# Patient Record
Sex: FEMALE | Race: WHITE | URBAN - METROPOLITAN AREA
[De-identification: names, ages, dates, MRNs, and addresses within clinical notes are randomized per-mention and may not be internally consistent; named-entity substitution may affect disease eponyms.]

---

## 2019-05-23 ENCOUNTER — CONVERSION ENCOUNTER (OUTPATIENT)
Dept: GASTROENTEROLOGY | Facility: CLINIC | Age: 54
End: 2019-05-23
Attending: INTERNAL MEDICINE

## 2019-08-09 ENCOUNTER — HOSPITAL ENCOUNTER (OUTPATIENT)
Dept: GASTROENTEROLOGY | Facility: HOSPITAL | Age: 54
Setting detail: HOSPITAL OUTPATIENT SURGERY
Discharge: HOME | End: 2019-08-09
Attending: INTERNAL MEDICINE

## 2019-12-10 ENCOUNTER — OFFICE VISIT CONVERTED (OUTPATIENT)
Dept: ORTHOPEDIC SURGERY | Facility: CLINIC | Age: 54
End: 2019-12-10
Attending: ORTHOPAEDIC SURGERY

## 2020-02-25 ENCOUNTER — OFFICE VISIT CONVERTED (OUTPATIENT)
Dept: NEUROSURGERY | Facility: CLINIC | Age: 55
End: 2020-02-25
Attending: PHYSICIAN ASSISTANT

## 2020-02-25 ENCOUNTER — CONVERSION ENCOUNTER (OUTPATIENT)
Dept: NEUROLOGY | Facility: CLINIC | Age: 55
End: 2020-02-25

## 2020-06-23 ENCOUNTER — OFFICE VISIT CONVERTED (OUTPATIENT)
Dept: NEUROLOGY | Facility: CLINIC | Age: 55
End: 2020-06-23
Attending: PSYCHIATRY & NEUROLOGY

## 2020-10-14 ENCOUNTER — OFFICE VISIT CONVERTED (OUTPATIENT)
Dept: NEUROLOGY | Facility: CLINIC | Age: 55
End: 2020-10-14
Attending: PSYCHIATRY & NEUROLOGY

## 2020-10-14 ENCOUNTER — CONVERSION ENCOUNTER (OUTPATIENT)
Dept: NEUROLOGY | Facility: CLINIC | Age: 55
End: 2020-10-14

## 2020-12-14 ENCOUNTER — CONVERSION ENCOUNTER (OUTPATIENT)
Dept: NEUROLOGY | Facility: CLINIC | Age: 55
End: 2020-12-14

## 2020-12-14 ENCOUNTER — OFFICE VISIT CONVERTED (OUTPATIENT)
Dept: NEUROLOGY | Facility: CLINIC | Age: 55
End: 2020-12-14
Attending: PSYCHIATRY & NEUROLOGY

## 2021-03-15 ENCOUNTER — OFFICE VISIT CONVERTED (OUTPATIENT)
Dept: NEUROLOGY | Facility: CLINIC | Age: 56
End: 2021-03-15
Attending: PSYCHIATRY & NEUROLOGY

## 2021-05-10 NOTE — H&P
History and Physical      Patient Name: Aaliyah Carson   Patient ID: [id number]   Sex: Female   YOB: 1965    Referring Provider: Kendall Shcrader MD    Visit Date: June 23, 2020    Provider: Junito Schmitz MD   Location: [facility]   Location Address: [address] [place] [address]  [id number]   Location Phone: [phone]          Chief Complaint     RUE numbness and tingling pain       History Of Present Illness  Aaliyah Carson is a 54 year old female who presents today to [facility] today referred from Kendall Schrader MD.      54-year-old woman evaluated for right arm pain.  She states that she started having right arm pain in 2009.  This is before her deployment of can a stent.  She had rotator cuff surgery in 2007.  She states her right arm continues to hurt and her significant limited range of motion.  She states that the pain is worse at night when she is sleeping and it wakes her up.  Her right arm hurts from the shoulder down when this happens.  She is had an MRI of the cervical spine which there is no significant neuroforaminal narrowing.       Past Medical History  *No Pertinent Past Medical History; Migraines; Tuberculosis         Past Surgical History  Hysterectomy; Partial Hysterectomy; Shoulder surgery         Allergy List  NO KNOWN DRUG ALLERGIES         Family Medical History  No family history of colorectal cancer; Family history of Arthritis         Social History  Alcohol (Current some day); ; Tobacco (Never); Working         Review of Systems  · Constitutional  o Denies  o : chills, excessive sweating, fatigue, fever, sycope/passing out, weight gain, weight loss  · Eyes  o Denies  o : changes in vision, blurry vision, double vision  · HENT  o Denies  o : loss of hearing, ringing in the ears, ear aches, sore throat, nasal congestion, sinus pain, nose bleeds, seasonal allergies  · Cardiovascular  o Denies  o : blood  "clots, swollen legs, anemia, easy burising or bleeding, transfusions  · Respiratory  o Denies  o : shortness of breath, dry cough, productive cough, pneumonia, COPD  · Gastrointestinal  o Denies  o : difficulty swallowing, reflux  · Genitourinary  o Denies  o : incontinence  · Neurologic  o Admits  o : difficulty with sleep, numbness/tingling/paresthesia   o Denies  o : headache, seizure, stroke, tremor, loss of balance, falls, dizziness/vertigo, difficulty with coordination, difficulty with dexterity, weakness  · Musculoskeletal  o Admits  o : neck stiffness/pain, weakness, pain radiating in arm  o Denies  o : swollen lymph nodes, muscle aches, joint pain, spasms, sciatica, pain radiating in leg, low back pain  · Endocrine  o Denies  o : diabetes, thyroid disorder  · Psychiatric  o Admits  o : anxiety  o Denies  o : depression      Vitals  Date Time BP Position Site L\R Cuff Size HR RR TEMP (F) WT  HT  BMI kg/m2 BSA m2 O2 Sat HC       06/23/2020 02:42 /80 Sitting    75 - R 18 97 143lbs 0oz 5'  2.5\" 25.74 1.69           Physical Examination     There is no weakness of the right upper extremities in these muscle testing.           Assessment  · Numbness and tingling       Anesthesia of skin     782.0/R20.0  Paresthesia of skin     782.0/R20.2  The EMG study is normal and does not show electrophysiologic evidence for cervical radiculopathy involving the C5-T1 ventral nerve roots. There is no evidence of denervation or reinnervation. Nerve conduction study shows electrophysiologic evidence for mild carpal tunnel syndrome.  · Shoulder pain     719.41/M25.519  I told her that her shoulder pain and neck pain is secondary to shoulder pathology and not cervical spine pathology.    Problems Reconciled  Plan  · Orders  o Muscle test done with Nerve test Complete (24289) - 719.41/M25.519, 782.0/R20.0, 782.0/R20.2 - 06/23/2020  o Nerve conduction studies; 5-6 studies (74195) - 719.41/M25.519, 782.0/R20.0, 782.0/R20.2 - " 06/23/2020  · Medications  o Medications have been Reconciled  o Transition of Care or Provider Policy  · Instructions  o Encouraged to follow-up with Primary Care Provider for preventative care.            Electronically Signed by: Junito Schmitz MD -Author on June 23, 2020 03:33:40 PM

## 2021-05-13 NOTE — PROGRESS NOTES
Progress Note      Patient Name: Aaliyah Carson   Patient ID: [id number]   Sex: Female   YOB: 1965    Referring Provider: Kendall Schrader MD    Visit Date: October 14, 2020    Provider: Junito Schmitz MD   Location: [facility]   Location Address: [address] [place] [address]  [id number]   Location Phone: [phone]          Chief Complaint     Established pt for migraines.       History Of Present Illness  Aaliyah Carson is a 55 year old female who presents today to [facility] today referred from Kendall Schrader MD.      55-year-old woman evaluated for chronic migraines.  She states that she was seen in the emergency room after she fell and hit her head on August 28.  She got dizzy and her right leg gave out on her and fell and hit her head.  She was not coherent.  She went to the emergency room and was told to follow-up with the neurologist because of her poorly controlled migraine headaches.  She states that she had numbness in the left side of her face and shoulder for 3-1/2 hours to have a day.  She did not have a headache that day.  She only had a headache on the left side of her head where she hit her head.    She still having migraines and she was deployed to [place] in 2010.  She states that her headaches have gotten worse and she is been taking Fioricet 4 times a day almost daily since a year ago.  Her nortriptyline was increased to 50 mg recently and her headaches are slightly better.  She takes Fioricet 4 times a day.  The headaches usually starts in the left frontal area and goes to the back of her head.  It is sharp and throbbing associated with light and noise sensitivity and nausea.  It can last from 4 hours to 1 day.  It is severe 5 times a month.  She has never taken topiramate in the past.  She has taken sumatriptan in the past which did not help her.    She states that she is retiring from the   after 20 years of service       Past Medical History  *No Pertinent Past Medical History; Migraines; Tuberculosis         Past Surgical History  Hysterectomy; Partial Hysterectomy; Shoulder surgery         Medication List  butalbital-acetaminophen  mg oral tablet; clonidine 0.1 mg/24 hr transdermal patch weekly; epinephrine 0.3 mg/0.3 mL injection auto-injector; nortriptyline 50 mg oral capsule; Zyrtec 10 mg oral capsule         Allergy List  NO KNOWN DRUG ALLERGIES         Family Medical History  No family history of colorectal cancer; Family history of Arthritis         Social History  Alcohol (Current some day); ; Tobacco (Never); Working         Review of Systems  · Constitutional  o Denies  o : chills, excessive sweating, fatigue, fever, sycope/passing out, weight gain, weight loss  · Eyes  o Denies  o : changes in vision, blurry vision, double vision  · HENT  o Admits  o : ringing in the ears, seasonal allergies  o Denies  o : loss of hearing, ear aches, sore throat, nasal congestion, sinus pain, nose bleeds  · Cardiovascular  o Admits  o : swollen legs  o Denies  o : blood clots, anemia, easy burising or bleeding, transfusions  · Respiratory  o Admits  o : shortness of breath  o Denies  o : dry cough, productive cough, pneumonia, COPD  · Gastrointestinal  o Denies  o : difficulty swallowing, reflux  · Genitourinary  o Denies  o : incontinence  · Neurologic  o Admits  o : headache, loss of balance, dizziness/vertigo, difficulty with sleep, numbness/tingling/paresthesia , difficulty with coordination, difficulty with dexterity, weakness  o Denies  o : seizure, stroke, tremor, falls  · Musculoskeletal  o Admits  o : neck stiffness/pain, muscle aches, joint pain, weakness, spasms, sciatica, pain radiating in arm, pain radiating in leg, low back pain  o Denies  o : swollen lymph nodes  · Endocrine  o Denies  o : diabetes, thyroid disorder  · Psychiatric  o Denies  o : anxiety,  "depression      Vitals  Date Time BP Position Site L\R Cuff Size HR RR TEMP (F) WT  HT  BMI kg/m2 BSA m2 O2 Sat FR L/min FiO2 HC       10/14/2020 02:38 PM        97.1           10/14/2020 03:05 /83 Sitting    83 - R   141lbs 0oz 5'  2\" 25.79 1.67             Physical Examination  · Constitutional  o Appearance  o : well-nourished, well groomed, in no apparent distress  · Eyes  o Pupils and Irises  o : pupils equal, round, and reactive to light and accommodation bilaterally  · Respiratory  o Auscultation of Lungs  o : Lungs were clear to ascultation bilaterally.   · Cardiovascular  o Heart  o :   § Auscultation of Heart  § : regular rhythem and normal rate.  o Peripheral Vascular System  o :   § Carotid Arteries  § : carotids are clear bilaterally  § Extremities  § : no peripheral edema was appreciated  · Musculoskeletal  o General  o : normal bulk and normal tone throughout. 5/5 motor strength throughout and symmetric.   · Neurologic  o Mental Status Examination  o :   § Orientation  § : Alert and oriented to person, place, and time.  § Speech/Language  § : Intact naming, comprehension, and repetition. No dysarthria.  § Memory  § : Intact  § Attention  § : Intact  § Fund of Knowledge  § : Adequate fund of knowledge.  § Mental Status Examination  § : Mental Status Score:   o Cranial Nerves  o : Pupils are equal, round and reactive to light. Extraocular movements are intact. Visual fields are full. Fundoscopic examination reveals sharp disc bilaterally. Sensation in the V1-V3 distribution is intact and symmetric. Muscles of mastication are strong and symmetric. Muscles of facial expression are strong and symmetric. Hearing is intact. Palatal raise is intact and symmetric. Uvula is midline. Shoulder shrug is strong. Tongue protrudes in the midline.  o Motor Examination  o : Normal bulk. 5/5 motor strength throughout and symmetric.   o Reflexes  o : 2+ reflexes throughout and symmetric.   o Sensation  o : Intact " sensation to light touch, symmetrical  o Gait and Station  o : Normal gait, arm swing and turning. Able to tiptoe, heel walk and alyssa and tandem without difficulty.  o Cerebellar Function  o : Intact finger to nose, rapid alternating movements and fine finger movements.          Assessment  · Medication overuse headache     339.3/G44.40  · Chronic migraine without aura     346.70/G43.709  I will start her on topiramate at 25 mg initially and increase the dose by 25 mg every week in 2 divided doses until she is taken up to 50 mg twice a day. I explained to her the adverse effects of topiramate including mood swings, rarely kidney stones, metabolic acidosis, weight loss. I told her it can cause paresthesias as well. She has medication overuse headache. I would like her to taper herself off Fioricet and then stop taking it. She can take sumatriptan for severe headaches. I will see her again in 2 months time for follow-up. Should her headaches not be improved at that time I will start her on 1 of the injectables. I will also start her on Nurtec for abortive treatment.    40 minutes was spent for this high complexity visit more than half the time was spent face-to-face with the patient for examination, counseling, planning and recommendations.      Plan  · Medications  o topiramate 25 mg oral tablet   SI QD X 1 wk, 1 BID 2nd wk, 1 Q AM and 2 Q PM 3rd wk and 2 BID 4th wk and thereafter.   DISP: (120) Tablet with 6 refills  Prescribed on 10/14/2020     o Medications have been Reconciled  o Transition of Care or Provider Policy  · Instructions  o Encouraged to follow-up with Primary Care Provider for preventative care.  · Referrals  o ID: [id number] Date: 10/09/2020 Type: Inbound  Specialty: Neurology            Electronically Signed by: Junito Schmitz MD -Author on 2020 03:51:03 PM

## 2021-05-13 NOTE — PROGRESS NOTES
Progress Note      Patient Name: Aaliyah Carson   Patient ID: [id number]   Sex: Female   YOB: 1965    Referring Provider: Kendall Schrader MD    Visit Date: December 14, 2020    Provider: Junito Schmitz MD   Location: [facility]   Location Address: [address] [place] [address]  [id number]   Location Phone: [phone]          Chief Complaint     F/u visit for migraines.       History Of Present Illness  Aaliyah Carson is a 55 year old female who presents today to [facility] today referred from Kendall Schrader MD.      55-year-old woman here for follow-up for headaches.  She states that she cannot remember most of her topiramate.  She tells me that she is taking 1 twice a day.  I gave her instructions how to do that the last time and she states that she thinks that she is taking it the right way.  She has had memory loss since she came back from [facility].  It is the same.  It is not get any worse.  She states that she is not under a lot of stress.  She does not have a diagnosis of PTSD.  She tells me that her headaches are getting better.  She is getting 1 moderate headache when she is on the computer however the mild headaches are almost gone.  She was getting headache every day she was taking Fioricet 4 times a day.  In terms of her severe headache she is still getting it 5 times a month and it can last for several hours to a day.  It is the same thing that she told me the last time.    She had an MRI of the brain in August of this year which is unremarkable.       Past Medical History  *No Pertinent Past Medical History; Migraines; Tuberculosis         Past Surgical History  Hysterectomy; Partial Hysterectomy; Shoulder surgery         Medication List  butalbital-acetaminophen  mg oral tablet; clonidine 0.1 mg/24 hr transdermal patch weekly; epinephrine 0.3 mg/0.3 mL injection auto-injector; nortriptyline 50 mg oral  "capsule; topiramate 25 mg oral tablet; Zyrtec 10 mg oral capsule         Allergy List  NO KNOWN DRUG ALLERGIES         Family Medical History  No family history of colorectal cancer; Family history of Arthritis         Social History  Alcohol (Current some day); ; Tobacco (Never); Working         Review of Systems  · Constitutional  o Admits  o : chills, fatigue  o Denies  o : excessive sweating, fever, sycope/passing out, weight gain, weight loss  · Eyes  o Denies  o : changes in vision, blurry vision, double vision  · HENT  o Admits  o : ringing in the ears  o Denies  o : loss of hearing, ear aches, sore throat, nasal congestion, sinus pain, nose bleeds, seasonal allergies  · Cardiovascular  o Denies  o : blood clots, swollen legs, anemia, easy burising or bleeding, transfusions  · Respiratory  o Admits  o : productive cough  o Denies  o : shortness of breath, dry cough, pneumonia, COPD  · Gastrointestinal  o Denies  o : difficulty swallowing, reflux  · Genitourinary  o Denies  o : incontinence  · Neurologic  o Admits  o : headache, loss of balance, dizziness/vertigo, difficulty with sleep, numbness/tingling/paresthesia , difficulty with coordination  o Denies  o : seizure, stroke, tremor, falls, difficulty with dexterity, weakness  · Musculoskeletal  o Admits  o : neck stiffness/pain, joint pain, pain radiating in arm, pain radiating in leg, low back pain  o Denies  o : swollen lymph nodes, muscle aches, weakness, spasms, sciatica  · Endocrine  o Denies  o : diabetes, thyroid disorder  · Psychiatric  o Denies  o : anxiety, depression      Vitals  Date Time BP Position Site L\R Cuff Size HR RR TEMP (F) WT  HT  BMI kg/m2 BSA m2 O2 Sat FR L/min FiO2 HC       12/14/2020 02:44 PM        97.3           12/14/2020 03:31 /72 Sitting    71 - R   137lbs 0oz 5'  2\" 25.06 1.65             Physical Examination     She is alert, fluent, phasic, follows commands well.  Her heart is regular in rhythm normal in rate. "           Assessment  · Memory loss     780.93/R41.3  · Chronic migraine without aura     346.70/G43.709  I discussed with her that I will increase her topiramate to a higher dose. She is to take 50 mg twice a day this week and increase it by 25 mg every week in 2 divided doses until she is taken up to 100 mg twice a day. Explained to her that adverse effects of higher dose including increasing confusion, paresthesias. I will see her again in 2 months time for follow-up.    25 minutes was spent for this moderate complexity but not to the right side. encounter more half the time was spent face-to-face with patient for examination, counseling, planning and recommendations.      Plan  · Medications  o topiramate 100 mg oral tablet   SIG: take 1 tablet (100 mg) by oral route 2 times per day starting in 1 month.   DISP: (60) Tablet with 2 refills  Prescribed on 12/14/2020     · Instructions  o Encouraged to follow-up with Primary Care Provider for preventative care.  · Referrals  o ID: [id number] Date: 10/09/2020 Type: Inbound  Specialty: Neurology            Electronically Signed by: Junito Schmitz MD -Author on December 14, 2020 04:37:29 PM

## 2021-05-14 VITALS
WEIGHT: 141 LBS | DIASTOLIC BLOOD PRESSURE: 83 MMHG | SYSTOLIC BLOOD PRESSURE: 121 MMHG | TEMPERATURE: 97.1 F | HEART RATE: 83 BPM | HEIGHT: 62 IN | BODY MASS INDEX: 25.95 KG/M2

## 2021-05-14 VITALS
DIASTOLIC BLOOD PRESSURE: 72 MMHG | SYSTOLIC BLOOD PRESSURE: 130 MMHG | TEMPERATURE: 97.3 F | HEART RATE: 71 BPM | BODY MASS INDEX: 25.21 KG/M2 | WEIGHT: 137 LBS | HEIGHT: 62 IN

## 2021-05-14 VITALS
HEART RATE: 88 BPM | WEIGHT: 132.44 LBS | BODY MASS INDEX: 24.37 KG/M2 | HEIGHT: 62 IN | SYSTOLIC BLOOD PRESSURE: 110 MMHG | DIASTOLIC BLOOD PRESSURE: 75 MMHG

## 2021-05-14 NOTE — PROGRESS NOTES
Progress Note      Patient Name: Aaliyah Carson   Patient ID: [id number]   Sex: Female   YOB: 1965    Referring Provider: Kendall Schrader MD    Visit Date: March 15, 2021    Provider: Junito Schmitz MD   Location: [facility]   Location Address: [address] [place] [address]  [id number]   Location Phone: [phone]          Chief Complaint     Pt here for f/u       History Of Present Illness  Aaliyah Carson is a 55 year old female who presents today to [facility] today referred from Kendall Schrader MD.      55-year-old woman follow-up for headaches.  She is getting 2-3 headaches a week lasting from 30 minutes to an hour after she got Covid.  She is taking topiramate 100 mg twice a day.  She is also taking nortriptyline 50 mg nightly.  She states that she is leaving for [place] the end of this month.  She is going to [place] thereafter.  She is no longer taking butalbital.       Past Medical History  *No Pertinent Past Medical History; Migraines; Tuberculosis         Past Surgical History  Hysterectomy; Partial Hysterectomy; Shoulder surgery         Medication List  butalbital-acetaminophen  mg oral tablet; clonidine 0.1 mg/24 hr transdermal patch weekly; epinephrine 0.3 mg/0.3 mL injection auto-injector; nortriptyline 50 mg oral capsule; topiramate 100 mg oral tablet; topiramate 25 mg oral tablet; Zyrtec 10 mg oral capsule         Allergy List  NO KNOWN DRUG ALLERGIES         Family Medical History  No family history of colorectal cancer; Family history of Arthritis         Social History  Alcohol (Current some day); ; Tobacco (Never); Working         Review of Systems  · Constitutional  o Denies  o : chills, excessive sweating, fatigue, fever, sycope/passing out, weight gain, weight loss  · Eyes  o Denies  o : changes in vision, blurred vision, double vision  · HENT  o Admits  o : seasonal  "allergies  o Denies  o : hearing loss, ringing in the ears, ear aches, sore throat, nasal congestion, sinus pain, nose bleeds  · Cardiovascular  o Denies  o : blood clots, swollen legs, anemia, easy burising or bleeding, transfusions  · Respiratory  o Admits  o : shortness of breath, productive cough  o Denies  o : dry cough, pneumonia, COPD  · Gastrointestinal  o Denies  o : dysphagia, reflux  · Genitourinary  o Denies  o : incontinence  · Neurologic  o Admits  o : headache, falls, numbness/tingling/paresthesia , difficulty with coordination, weakness  o Denies  o : seizure, stroke, tremor, loss of balance, dizziness/vertigo, difficulty with sleep, difficulty with dexterity  · Musculoskeletal  o Admits  o : low back pain  o Denies  o : neck stiffness/pain, swollen lymph nodes, muscle aches, joint pain, weakness, spasms, sciatica, pain radiating in arm, pain radiating in leg  · Endocrine  o Denies  o : diabetes, thyroid disorder  · Psychiatric  o Denies  o : anxiety, depression      Vitals  Date Time BP Position Site L\R Cuff Size HR RR TEMP (F) WT  HT  BMI kg/m2 BSA m2 O2 Sat FR L/min FiO2 HC       03/15/2021 08:51 /75 Sitting    88 - R   132lbs 7oz 5'  2\" 24.22 1.62             Physical Examination     Alert, fluent, phasic, follows commands well.  Heart is regular in rhythm normal in rate.           Assessment  · Chronic migraine without aura     346.70/G43.709  She is to discontinue topiramate. She is to take 100 mg twice a day daily for 1 week and then every other day for 1 week and then stop taking topiramate. She is to continue taking nortriptyline. I am recommending for her to start taking Aimovig when she finds another neurologist in [place]. I discussed with her the adverse effects of Aimovig. Another    No follow-up was made.    Total time spent with patient coordinating patient care was 15 minutes.      Plan  · Medications  o Medications have been Reconciled  o Transition of Care or Provider " Policy  · Instructions  o Encouraged to follow-up with Primary Care Provider for preventative care.  · Referrals  o ID: [id number] Date: 10/09/2020 Type: Inbound  Specialty: Neurology            Electronically Signed by: Junito Schmitz MD -Author on March 15, 2021 09:22:50 AM

## 2021-05-15 VITALS — BODY MASS INDEX: 26.93 KG/M2 | OXYGEN SATURATION: 98 % | HEIGHT: 62 IN | WEIGHT: 146.37 LBS | HEART RATE: 81 BPM

## 2021-05-15 VITALS
SYSTOLIC BLOOD PRESSURE: 120 MMHG | HEIGHT: 62 IN | DIASTOLIC BLOOD PRESSURE: 80 MMHG | BODY MASS INDEX: 26.31 KG/M2 | TEMPERATURE: 97 F | RESPIRATION RATE: 18 BRPM | HEART RATE: 75 BPM | WEIGHT: 143 LBS

## 2021-05-15 VITALS — HEART RATE: 78 BPM | WEIGHT: 146 LBS | BODY MASS INDEX: 26.87 KG/M2 | HEIGHT: 62 IN
